# Patient Record
Sex: FEMALE | Race: WHITE | NOT HISPANIC OR LATINO
[De-identification: names, ages, dates, MRNs, and addresses within clinical notes are randomized per-mention and may not be internally consistent; named-entity substitution may affect disease eponyms.]

---

## 2017-10-01 ENCOUNTER — HEALTH MAINTENANCE LETTER (OUTPATIENT)
Age: 32
End: 2017-10-01

## 2018-06-22 ENCOUNTER — TRANSFERRED RECORDS (OUTPATIENT)
Dept: HEALTH INFORMATION MANAGEMENT | Facility: CLINIC | Age: 33
End: 2018-06-22

## 2018-06-25 ENCOUNTER — OFFICE VISIT (OUTPATIENT)
Dept: ORTHOPEDICS | Facility: CLINIC | Age: 33
End: 2018-06-25
Payer: OTHER MISCELLANEOUS

## 2018-06-25 VITALS — SYSTOLIC BLOOD PRESSURE: 130 MMHG | RESPIRATION RATE: 16 BRPM | HEART RATE: 102 BPM | DIASTOLIC BLOOD PRESSURE: 86 MMHG

## 2018-06-25 DIAGNOSIS — M25.542 ARTHRALGIA OF LEFT HAND: Primary | ICD-10-CM

## 2018-06-25 RX ORDER — SPIRONOLACTONE 50 MG/1
TABLET, FILM COATED ORAL
Refills: 12 | COMMUNITY
Start: 2018-06-03

## 2018-06-25 RX ORDER — FLUOXETINE 40 MG/1
CAPSULE ORAL
Refills: 2 | COMMUNITY
Start: 2018-06-05

## 2018-06-25 NOTE — LETTER
"  6/25/2018      RE: Arianna Harris  [address]  [place] [address]        Subjective:   Arianna Harris is a 32 year old female who presents with left hand pain and swelling for 2 weeks. She has had no injury.  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 This came on gradually    She was living in [place] and moved back to [place] 2016. She was working at [employer] in [place] and had \"carpal tunnel of both wrists, left is worse than right, due to repetitive motion.\" She is now a . She is left handed. No current numbness.  She has numbness in the whole thumb index and middle fingers since 2014.  That fluctuates. It comes and goes. Treatments are a brace at night.  Pain is better.Better with ibuprofen.   She is not concerned about the carpal tunnel today.    Background:   Date of injury: NA   Duration of symptoms: 2 years  Mechanism of Injury: Chronic; Unknown   Aggravating factors: Holding a pen/writing, driving, holding a phone, weakened   Relieving Factors: rest, ibuprofen, wrist stretching, wrist braces  Prior Evaluation: Prior Physician Evalutation: [facility], NSAIDS and EMG 2014    PAST MEDICAL, SOCIAL, SURGICAL AND FAMILY HISTORY: Past medical, surgical, family and social history was reviewed and unchanged since last visit.  ALLERGIES: She has No Known Allergies.    CURRENT MEDICATIONS: She has a current medication list which includes the following prescription(s): fluoxetine, metformin, and spironolactone.     REVIEW OF SYSTEMS: 3 point review of systems is negative except as noted above.  No fevers sweats or chills.     Exam:   /86  Pulse 102  Resp 16     CONSTITUTIONAL: healthy, alert and no distress  HEAD: Normocephalic. No masses, lesions, tenderness or abnormalities  SKIN: no suspicious lesions or rashes  GAIT: normal  NEUROLOGIC: Non-focal  PSYCHIATRIC: affect normal/bright and mentation appears normal.    MUSCULOSKELETAL:   L hand pain "   Tenderness and mild swelling between the index and middle finger, noted on the dorsal hand. no redness.       Assessment/Plan:   L hand pain  1. Acute pain--we discussed this appeared to be an overuse injury. There was no trauma or painful clicking by hx or subluxation of the extensor tendons on exam to suggest a saggital band injury. This is the hand she squeezes with while using her cake bag. No signs of infection.  --recommended ice,nsaids, hand therapy    Hx median neuropathy L hand  --she may benefit from additional evaluation and treatent for this, but wanted to focus on acute symptoms today. No atrophy , but If she has persistent numbness hand surgery evaluation may be appropriate. Happy to see and discuss in f/u.    Gael Bledsoe MD CAQ      Gael Bledsoe MD

## 2018-06-25 NOTE — MR AVS SNAPSHOT
After Visit Summary   2018    Arianna Harris    MRN: [id number]           Patient Information     Date Of Birth          1985        Visit Information        Provider Department      2018 4:30 PM Gael Bledsoe MD [facility]        Today's Diagnoses     Arthralgia of left hand    -  1      Care Instructions    We discussed that it appears to be an overuse tendinopathy  -- we discussed ibuprofen 200-600 mg every 6 hours as needed pain  -- acetaminophen 500-1000 mg every 6 hours as needed pain  -- ice 10-15 minutes every 6 hours as needed pain  -- hand therapy, recheck if not improving.    Gael lBedsoe MD CAQ            Follow-ups after your visit        Additional Services     HAND THERAPY       Hand Therapy Referral                  Who to contact     Please call your clinic at [phone] to:    Ask questions about your health    Make or cancel appointments    Discuss your medicines    Learn about your test results    Speak to your doctor            Additional Information About Your Visit        MyChart Information     [employer] is an electronic gateway that provides easy, online access to your medical records. With [employer], you can request a clinic appointment, read your test results, renew a prescription or communicate with your care team.     To sign up for [employer] visit the website at [email or web address]   You will be asked to enter the access code listed below, as well as some personal information. Please follow the directions to create your username and password.     Your access code is: [email or web address]  Expires: 2018  9:25 AM     Your access code will  in 90 days. If you need help or a new code, please contact your [facility] or call [phone] for assistance.        Care EveryWhere ID     This is your Care EveryWhere ID. This could be used by other organizations to access your [facility]  records  [id number]        Your Vitals Were     Pulse Respirations                102 16           Blood Pressure from Last 3 Encounters:   06/25/18 130/86    Weight from Last 3 Encounters:   No data found for Wt              We Performed the Following     HAND THERAPY        Primary Care Provider Office Phone # Fax #    Aniyah Liu [phone] [address]  [place] [address]        Equal Access to Services     [facility]: Hadii aad ku hadasho Soomaali, waaxda luqadaha, qaybta kaalmada adeegyada, waxay idiin hayaan adeeg js labriannan . So [facility] [phone].    ATENCIÓN: Si pato schneider, tiene a drake disposición servicios gratuitos de asistencia lingüística. Llame al [phone].    We comply with applicable federal civil rights laws and [place] laws. We do not discriminate on the basis of race, color, national origin, age, disability, sex, sexual orientation, or gender identity.            Thank you!     Thank you for choosing [facility]  for your care. Our goal is always to provide you with excellent care. Hearing back from our patients is one way we can continue to improve our services. Please take a few minutes to complete the written survey that you may receive in the mail after your visit with us. Thank you!             Your Updated Medication List - Protect others around you: Learn how to safely use, store and throw away your medicines at www.disposemymeds.org.          This list is accurate as of 6/25/18 11:59 PM.  Always use your most recent med list.                   Brand Name Dispense Instructions for use Diagnosis    FLUoxetine 40 MG capsule    PROzac     TAKE 1 CAPSULE BY MOUTH EVERY MORNING.    Arthralgia of left hand       metFORMIN 500 MG tablet    GLUCOPHAGE     TAKE 1 TABLET BY MOUTH 2 TIMES DAILY WITH MEALS.    Arthralgia of left hand       spironolactone 50 MG tablet    ALDACTONE     TAKE 1 TABLET BY MOUTH ONCE DAILY.    Arthralgia of left  hand

## 2018-06-25 NOTE — PATIENT INSTRUCTIONS
We discussed that it appears to be an overuse tendinopathy  -- we discussed ibuprofen 200-600 mg every 6 hours as needed pain  -- acetaminophen 500-1000 mg every 6 hours as needed pain  -- ice 10-15 minutes every 6 hours as needed pain  -- hand therapy, recheck if not improving.    Gael Bledsoe MD CAQ

## 2018-06-25 NOTE — PROGRESS NOTES
" Subjective:   Arianna Harris is a 32 year old female who presents with left hand pain and swelling for 2 weeks. She has had no injury.  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 This came on gradually    She was living in [place] and moved back to [place] 2016. She was working at [employer] in [place] and had \"carpal tunnel of both wrists, left is worse than right, due to repetitive motion.\" She is now a . She is left handed. No current numbness.  She has numbness in the whole thumb index and middle fingers since 2014.  That fluctuates. It comes and goes. Treatments are a brace at night.  Pain is better.Better with ibuprofen.   She is not concerned about the carpal tunnel today.    Background:   Date of injury: NA   Duration of symptoms: 2 years  Mechanism of Injury: Chronic; Unknown   Aggravating factors: Holding a pen/writing, driving, holding a phone, weakened   Relieving Factors: rest, ibuprofen, wrist stretching, wrist braces  Prior Evaluation: Prior Physician Evalutation: [facility], NSAIDS and EMG 2014    PAST MEDICAL, SOCIAL, SURGICAL AND FAMILY HISTORY: Past medical, surgical, family and social history was reviewed and unchanged since last visit.  ALLERGIES: She has No Known Allergies.    CURRENT MEDICATIONS: She has a current medication list which includes the following prescription(s): fluoxetine, metformin, and spironolactone.     REVIEW OF SYSTEMS: 3 point review of systems is negative except as noted above.  No fevers sweats or chills.     Exam:   /86  Pulse 102  Resp 16     CONSTITUTIONAL: healthy, alert and no distress  HEAD: Normocephalic. No masses, lesions, tenderness or abnormalities  SKIN: no suspicious lesions or rashes  GAIT: normal  NEUROLOGIC: Non-focal  PSYCHIATRIC: affect normal/bright and mentation appears normal.    MUSCULOSKELETAL:   L hand pain   Tenderness and mild swelling between the index and middle finger, noted on the dorsal " hand. no redness.       Assessment/Plan:   L hand pain  1. Acute pain--we discussed this appeared to be an overuse injury. There was no trauma or painful clicking by hx or subluxation of the extensor tendons on exam to suggest a saggital band injury. This is the hand she squeezes with while using her cake bag. No signs of infection.  --recommended ice,nsaids, hand therapy    Hx median neuropathy L hand  --she may benefit from additional evaluation and treatent for this, but wanted to focus on acute symptoms today. No atrophy , but If she has persistent numbness hand surgery evaluation may be appropriate. Happy to see and discuss in f/u.    Gael Bledsoe MD CAQ

## 2025-08-15 ENCOUNTER — LAB REQUISITION (OUTPATIENT)
Dept: LAB | Facility: CLINIC | Age: 40
End: 2025-08-15

## 2025-08-15 DIAGNOSIS — Z12.4 ENCOUNTER FOR SCREENING FOR MALIGNANT NEOPLASM OF CERVIX: ICD-10-CM

## 2025-08-15 PROCEDURE — 87624 HPV HI-RISK TYP POOLED RSLT: CPT | Performed by: OBSTETRICS & GYNECOLOGY

## 2025-08-15 PROCEDURE — G0145 SCR C/V CYTO,THINLAYER,RESCR: HCPCS | Performed by: OBSTETRICS & GYNECOLOGY

## 2025-08-18 LAB
HPV HR 12 DNA CVX QL NAA+PROBE: NEGATIVE
HPV16 DNA CVX QL NAA+PROBE: NEGATIVE
HPV18 DNA CVX QL NAA+PROBE: NEGATIVE
HUMAN PAPILLOMA VIRUS FINAL DIAGNOSIS: NORMAL

## 2025-08-20 LAB
BKR AP ASSOCIATED HPV REPORT: NORMAL
BKR LAB AP GYN ADEQUACY: NORMAL
BKR LAB AP GYN INTERPRETATION: NORMAL
BKR LAB AP LMP: NORMAL
BKR LAB AP PREVIOUS ABNL DX: NORMAL
BKR LAB AP PREVIOUS ABNORMAL: NORMAL
PATH REPORT.COMMENTS IMP SPEC: NORMAL
PATH REPORT.COMMENTS IMP SPEC: NORMAL
PATH REPORT.RELEVANT HX SPEC: NORMAL